# Patient Record
Sex: MALE | Race: BLACK OR AFRICAN AMERICAN | Employment: FULL TIME | URBAN - METROPOLITAN AREA
[De-identification: names, ages, dates, MRNs, and addresses within clinical notes are randomized per-mention and may not be internally consistent; named-entity substitution may affect disease eponyms.]

---

## 2021-01-28 ENCOUNTER — OFFICE VISIT (OUTPATIENT)
Dept: FAMILY MEDICINE CLINIC | Age: 41
End: 2021-01-28
Payer: MEDICAID

## 2021-01-28 VITALS
HEIGHT: 67 IN | SYSTOLIC BLOOD PRESSURE: 174 MMHG | WEIGHT: 180 LBS | TEMPERATURE: 98.3 F | BODY MASS INDEX: 28.25 KG/M2 | HEART RATE: 79 BPM | RESPIRATION RATE: 17 BRPM | OXYGEN SATURATION: 96 % | DIASTOLIC BLOOD PRESSURE: 108 MMHG

## 2021-01-28 DIAGNOSIS — R35.0 FREQUENT URINATION: Primary | ICD-10-CM

## 2021-01-28 DIAGNOSIS — I10 ESSENTIAL HYPERTENSION: ICD-10-CM

## 2021-01-28 PROCEDURE — 99203 OFFICE O/P NEW LOW 30 MIN: CPT | Performed by: FAMILY MEDICINE

## 2021-01-28 RX ORDER — LISINOPRIL AND HYDROCHLOROTHIAZIDE 20; 25 MG/1; MG/1
1 TABLET ORAL DAILY
Qty: 90 TAB | Refills: 3 | Status: SHIPPED | OUTPATIENT
Start: 2021-01-28 | End: 2021-02-09 | Stop reason: ALTCHOICE

## 2021-01-28 NOTE — PROGRESS NOTES
Chief Complaint   Patient presents with   [facility]     Pt reports that he has been having pain in his feet, some swelling. Pt is concerned that pain is from gout. Pt reports that sometimes it feels like his feet are going to give way. 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 Pt reports that he has been urinating frequently, did have an abnormal blood sugar at his DOT. Pt has noted some decrease in appetite. Subjective: (As above and below)     Chief Complaint   Patient presents with   [email or web address]     he is a 47y.o. year old male who presents for evaluation. Reviewed PmHx, RxHx, FmHx, SocHx, AllgHx and updated in chart. Review of Systems - negative except as listed above    Objective:     Vitals:    01/28/21 1013   BP: (!) 174/108   Pulse: 79   Resp: 17   Temp: 98.3 °F (36.8 °C)   TempSrc: Temporal   SpO2: 96%   Weight: 180 lb (81.6 kg)   Height: 5' 7\" (1.702 m)     Physical Examination: General appearance - alert, well appearing, and in no distress  Mental status - normal mood, behavior, speech, dress, motor activity, and thought processes  Ears - bilateral TM's and external ear canals normal  Chest - clear to auscultation, no wheezes, rales or rhonchi, symmetric air entry  Heart - normal rate, regular rhythm, normal S1, S2, no murmurs, rubs, clicks or gallops  Musculoskeletal - no joint tenderness, deformity or swelling  Extremities - peripheral pulses normal, no pedal edema, no clubbing or cyanosis  Feet cool to touch , even after wearing socks and shoes. Normal pulses  Assessment/ Plan:   1. Frequent urination  Check labs  - HEMOGLOBIN A1C WITH EAG    2. Essential hypertension  -start on medication as written, recheck BP in 2 weeks  - METABOLIC PANEL, COMPREHENSIVE  - CBC W/O DIFF  - LIPID PANEL  - HEMOGLOBIN A1C WITH EAG  - TSH 3RD GENERATION  - lisinopril-hydroCHLOROthiazide (PRINZIDE, ZESTORETIC) 20-25 mg per tablet;  Take 1 Tab by mouth daily.  Dispense: 90 Tab; Refill: 3       I have discussed the diagnosis with the patient and the intended plan as seen in the above orders. The patient has received an after-visit summary and questions were answered concerning future plans.      Medication Side Effects and Warnings were discussed with patient: yes  Patient Labs were reviewed: yes  Patient Past Records were reviewed:  yes    Vita García M.D.

## 2021-01-28 NOTE — PROGRESS NOTES
Chief Complaint   Patient presents with   [facility] Maintenance reviewed     1. Have you been to the ER, urgent care clinic since your last visit? Hospitalized since your last visit? No     2. Have you seen or consulted any other health care providers outside of the [address] [place] [address] since your last visit? Include any pap smears or colon screening.  No

## 2021-01-29 LAB
ALBUMIN SERPL-MCNC: 4.6 G/DL (ref 4–5)
ALBUMIN/GLOB SERPL: 1.9 {RATIO} (ref 1.2–2.2)
ALP SERPL-CCNC: 74 IU/L (ref 39–117)
ALT SERPL-CCNC: 19 IU/L (ref 0–44)
AST SERPL-CCNC: 15 IU/L (ref 0–40)
BILIRUB SERPL-MCNC: 0.4 MG/DL (ref 0–1.2)
BUN SERPL-MCNC: 9 MG/DL (ref 6–20)
BUN/CREAT SERPL: 9 (ref 9–20)
CALCIUM SERPL-MCNC: 10 MG/DL (ref 8.7–10.2)
CHLORIDE SERPL-SCNC: 102 MMOL/L (ref 96–106)
CHOLEST SERPL-MCNC: 142 MG/DL (ref 100–199)
CO2 SERPL-SCNC: 27 MMOL/L (ref 20–29)
CREAT SERPL-MCNC: 0.97 MG/DL (ref 0.76–1.27)
ERYTHROCYTE [DISTWIDTH] IN BLOOD BY AUTOMATED COUNT: 11.9 % (ref 11.6–15.4)
EST. AVERAGE GLUCOSE BLD GHB EST-MCNC: 91 MG/DL
GLOBULIN SER CALC-MCNC: 2.4 G/DL (ref 1.5–4.5)
GLUCOSE SERPL-MCNC: 83 MG/DL (ref 65–99)
HBA1C MFR BLD: 4.8 % (ref 4.8–5.6)
HCT VFR BLD AUTO: 45.1 % (ref 37.5–51)
HDLC SERPL-MCNC: 38 MG/DL
HGB BLD-MCNC: 15 G/DL (ref 13–17.7)
INTERPRETATION, 910389: NORMAL
LDLC SERPL CALC-MCNC: 91 MG/DL (ref 0–99)
MCH RBC QN AUTO: 30.1 PG (ref 26.6–33)
MCHC RBC AUTO-ENTMCNC: 33.3 G/DL (ref 31.5–35.7)
MCV RBC AUTO: 90 FL (ref 79–97)
PLATELET # BLD AUTO: 288 X10E3/UL (ref 150–450)
POTASSIUM SERPL-SCNC: 4.2 MMOL/L (ref 3.5–5.2)
PROT SERPL-MCNC: 7 G/DL (ref 6–8.5)
RBC # BLD AUTO: 4.99 X10E6/UL (ref 4.14–5.8)
SODIUM SERPL-SCNC: 141 MMOL/L (ref 134–144)
TRIGL SERPL-MCNC: 61 MG/DL (ref 0–149)
TSH SERPL DL<=0.005 MIU/L-ACNC: 0.65 UIU/ML (ref 0.45–4.5)
VLDLC SERPL CALC-MCNC: 13 MG/DL (ref 5–40)
WBC # BLD AUTO: 11.9 X10E3/UL (ref 3.4–10.8)

## 2021-02-09 ENCOUNTER — OFFICE VISIT (OUTPATIENT)
Dept: FAMILY MEDICINE CLINIC | Age: 41
End: 2021-02-09
Payer: MEDICAID

## 2021-02-09 VITALS
HEIGHT: 67 IN | OXYGEN SATURATION: 97 % | BODY MASS INDEX: 27.84 KG/M2 | SYSTOLIC BLOOD PRESSURE: 126 MMHG | WEIGHT: 177.4 LBS | DIASTOLIC BLOOD PRESSURE: 79 MMHG | TEMPERATURE: 97.4 F | HEART RATE: 85 BPM | RESPIRATION RATE: 15 BRPM

## 2021-02-09 DIAGNOSIS — I10 ESSENTIAL HYPERTENSION: ICD-10-CM

## 2021-02-09 DIAGNOSIS — R20.0 BILATERAL LEG NUMBNESS: Primary | ICD-10-CM

## 2021-02-09 PROCEDURE — 99214 OFFICE O/P EST MOD 30 MIN: CPT | Performed by: FAMILY MEDICINE

## 2021-02-09 RX ORDER — SPIRONOLACTONE 50 MG/1
50 TABLET, FILM COATED ORAL DAILY
Qty: 90 TAB | Refills: 1 | Status: SHIPPED | OUTPATIENT
Start: 2021-02-09

## 2021-02-09 RX ORDER — LISINOPRIL 20 MG/1
20 TABLET ORAL DAILY
Qty: 90 TAB | Refills: 1 | Status: SHIPPED | OUTPATIENT
Start: 2021-02-09
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
## 2021-02-09 NOTE — PROGRESS NOTES
1. Have you been to the ER, urgent care clinic since your last visit? Hospitalized since your last visit? No    2. Have you seen or consulted any other health care providers outside of the [address] [place] [address] since your last visit? Include any pap smears or colon screening.  No     Health Maintenance Due   Topic Date Due    Pneumococcal 0-64 years (1 of 1 - PPSV23) 02/16/1988    COVID-19 Vaccine (1 of 2) 02/16/1998    DTaP/Tdap/Td series (1 - Tdap) 02/16/2003    Flu Vaccine (1) 09/01/2020

## 2021-02-23 ENCOUNTER — TELEPHONE (OUTPATIENT)
Dept: FAMILY MEDICINE CLINIC | Age: 41
End: 2021-02-23

## 2021-02-23 NOTE — TELEPHONE ENCOUNTER
Pt states phone number was wrong in his demographic info. He has not received a phone call from the care coordinator about the xray we ordered on his spine.  Please call pt to set up xray appt or call care coordinator to have them call the pt. [phone]

## 2021-03-24 ENCOUNTER — TELEPHONE (OUTPATIENT)
Dept: FAMILY MEDICINE CLINIC | Age: 41
End: 2021-03-24

## 2021-03-24 ENCOUNTER — HOSPITAL ENCOUNTER (OUTPATIENT)
Dept: GENERAL RADIOLOGY | Age: 41
Discharge: HOME | End: 2021-03-24
Payer: MEDICAID

## 2021-03-24 DIAGNOSIS — R20.0 BILATERAL LEG NUMBNESS: ICD-10-CM

## 2021-03-24 PROCEDURE — 72100 X-RAY EXAM L-S SPINE 2/3 VWS: CPT

## 2021-03-24 NOTE — TELEPHONE ENCOUNTER
Pt has lost his referral from 2/9/21. I reprinted what was listed under the referrals tab. If something else should be given to pt please print so pt can .

## 2021-03-24 NOTE — PROGRESS NOTES
Mild arthritis noted, recommend follow up with neurology. Referral given during appt.    Please inform

## 2021-03-25 ENCOUNTER — OFFICE VISIT (OUTPATIENT)
Dept: NEUROLOGY | Age: 41
End: 2021-03-25
Payer: MEDICAID

## 2021-03-25 VITALS
OXYGEN SATURATION: 97 % | BODY MASS INDEX: 28.09 KG/M2 | HEIGHT: 67 IN | SYSTOLIC BLOOD PRESSURE: 130 MMHG | TEMPERATURE: 97.7 F | RESPIRATION RATE: 18 BRPM | DIASTOLIC BLOOD PRESSURE: 82 MMHG | WEIGHT: 179 LBS | HEART RATE: 80 BPM

## 2021-03-25 DIAGNOSIS — D47.2 MGUS (MONOCLONAL GAMMOPATHY OF UNKNOWN SIGNIFICANCE): ICD-10-CM

## 2021-03-25 DIAGNOSIS — R93.7 ABNORMAL X-RAY OF LUMBAR SPINE: Primary | ICD-10-CM

## 2021-03-25 DIAGNOSIS — E55.9 VITAMIN D DEFICIENCY: ICD-10-CM

## 2021-03-25 DIAGNOSIS — E53.8 B12 DEFICIENCY: ICD-10-CM

## 2021-03-25 DIAGNOSIS — R20.0 SENSORY LOSS: ICD-10-CM

## 2021-03-25 DIAGNOSIS — Q76.49 LUMBARIZATION, VERTEBRA: ICD-10-CM

## 2021-03-25 PROCEDURE — 99214 OFFICE O/P EST MOD 30 MIN: CPT | Performed by: PSYCHIATRY & NEUROLOGY

## 2021-03-25 RX ORDER — IBUPROFEN 200 MG
800 TABLET ORAL
COMMUNITY
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
## 2021-03-25 NOTE — LETTER
3/25/2021 Patient: Jenny Reynaga YOB: 1980 Date of Visit: 3/25/2021 Maximo Vigil MD 
[address] [place] [address] [address] [employer] Dear Krunal Davis MD, Thank you for referring Mr. Jenny Reynaga to [address] [place] [address] for evaluation. My notes for this consultation are attached. If you have questions, please do not hesitate to call me. I look forward to following your patient along with you. Sincerely, Hilario Apple MD

## 2021-04-06 ENCOUNTER — HOSPITAL ENCOUNTER (OUTPATIENT)
Dept: MRI IMAGING | Age: 41
Discharge: HOME | End: 2021-04-06
Attending: PSYCHIATRY & NEUROLOGY
Payer: MEDICAID

## 2021-04-06 DIAGNOSIS — Q76.49 LUMBARIZATION, VERTEBRA: ICD-10-CM

## 2021-04-06 DIAGNOSIS — R93.7 ABNORMAL X-RAY OF LUMBAR SPINE: ICD-10-CM

## 2021-04-06 PROCEDURE — 72148 MRI LUMBAR SPINE W/O DYE: CPT

## 2021-04-08 DIAGNOSIS — M48.04 THORACIC STENOSIS: Primary | ICD-10-CM

## 2021-04-13 ENCOUNTER — TELEPHONE (OUTPATIENT)
Dept: NEUROLOGY | Age: 41
End: 2021-04-13

## 2021-04-15 ENCOUNTER — HOSPITAL ENCOUNTER (OUTPATIENT)
Dept: MRI IMAGING | Age: 41
Discharge: HOME | End: 2021-04-15
Attending: PSYCHIATRY & NEUROLOGY
Payer: MEDICAID

## 2021-04-15 DIAGNOSIS — M48.04 THORACIC STENOSIS: ICD-10-CM

## 2021-04-15 DIAGNOSIS — M47.14 DEGENERATIVE ARTHRITIS OF THORACIC SPINE WITH CORD COMPRESSION: Primary | ICD-10-CM

## 2021-04-15 PROCEDURE — 72146 MRI CHEST SPINE W/O DYE: CPT

## 2021-04-15 RX ORDER — DEXAMETHASONE 4 MG/1
4 TABLET ORAL 2 TIMES DAILY WITH MEALS
Qty: 20 TAB | Refills: 0 | Status: SHIPPED | OUTPATIENT
Start: 2021-04-15

## 2021-04-16 ENCOUNTER — TELEPHONE (OUTPATIENT)
Dept: NEUROLOGY | Age: 41
End: 2021-04-16

## 2021-04-16 NOTE — TELEPHONE ENCOUNTER
lvm for Dr Christina Huynh office for return call to set up appointment referred for thoracic spine with cord compression.

## 2021-04-19 NOTE — TELEPHONE ENCOUNTER
Spoke with Dr Vladimir Bautista to set up appointment for patient.   Patient has medicaid and they do not accept medicaid

## 2021-04-26 ENCOUNTER — TELEPHONE (OUTPATIENT)
Dept: NEUROLOGY | Age: 41
End: 2021-04-26

## 2022-08-04 ENCOUNTER — DOCUMENTATION ONLY (OUTPATIENT)
Dept: NEUROLOGY | Age: 42
End: 2022-08-04

## 2022-08-04 ENCOUNTER — OFFICE VISIT (OUTPATIENT)
Dept: NEUROLOGY | Age: 42
End: 2022-08-04
Payer: MEDICAID

## 2022-08-04 VITALS
WEIGHT: 183 LBS | HEIGHT: 68 IN | BODY MASS INDEX: 27.74 KG/M2 | SYSTOLIC BLOOD PRESSURE: 150 MMHG | HEART RATE: 85 BPM | DIASTOLIC BLOOD PRESSURE: 90 MMHG | RESPIRATION RATE: 16 BRPM | OXYGEN SATURATION: 98 %

## 2022-08-04 DIAGNOSIS — M47.14 THORACIC SPONDYLOSIS WITH CORD COMPRESSION: ICD-10-CM

## 2022-08-04 DIAGNOSIS — R20.2 PARESTHESIA: Primary | ICD-10-CM

## 2022-08-04 PROCEDURE — 99213 OFFICE O/P EST LOW 20 MIN: CPT | Performed by: PSYCHIATRY & NEUROLOGY

## 2022-08-04 RX ORDER — HYDROCHLOROTHIAZIDE 25 MG/1
25 TABLET ORAL DAILY
COMMUNITY

## 2022-08-04 RX ORDER — GABAPENTIN 300 MG/1
300 CAPSULE ORAL 3 TIMES DAILY
Qty: 90 CAPSULE | Refills: 0 | Status: SHIPPED | OUTPATIENT
Start: 2022-08-04 | End: 2022-09-03

## 2022-08-04 NOTE — PROGRESS NOTES
Chief Complaint   Patient presents with    New Patient     Numbness: Patient reports numbness on both legs (A few years ago). Reports it gets cold.       Visit Vitals  BP (!) 150/90   Pulse 85   Resp 16   Ht 5' 8\" (1.727 m)   Wt 183 lb (83 kg)   SpO2 98%   BMI 27.83 kg/m²

## 2022-08-04 NOTE — PROGRESS NOTES
Referrals faxed to [facility]. Galina Isabel MD for neurosurgery. Received a successful confirmation.

## 2022-08-04 NOTE — PROGRESS NOTES
Neurology Clinic Follow up Note    Patient ID:  Taylor Hayes  [id number]  38 y.o.  1980      Mr. Harris is here for follow up today of  Chief Complaint   Patient presents with    New Patient     Numbness: Patient reports numbness on both legs (A few years ago). Reports it gets cold. Last Appointment With Me:  Visit date not found       Interval History:     Interval since last being seen by Dr. Miki Acosta does not 21 patient underwent imaging which appeared to demonstrate compression of the lower thoracic cord. Patient states that he was told by Dr. Ludin Cahn to go to various ERs to build a paper trail to facilitate surgery (?). Since that time symptoms have remained largely unchanged. PMHx/ PSHx/ FHx/ SHx:  Reviewed and unchanged previous visit. ROS:  Comprehensive review of systems negative except for as noted above. Objective:       Meds:  Current Outpatient Medications   Medication Sig Dispense Refill    hydroCHLOROthiazide (HYDRODIURIL) 25 mg tablet Take 25 mg by mouth in the morning.      gabapentin (NEURONTIN) 300 mg capsule Take 1 Capsule by mouth three (3) times daily for 30 days. Max Daily Amount: 900 mg. 90 Capsule 0    spironolactone (ALDACTONE) 50 mg tablet Take 1 Tab by mouth daily. 90 Tab 1    dexAMETHasone (Decadron) 4 mg tablet Take 4 mg by mouth two (2) times daily (with meals). (Patient not taking: Reported on 8/4/2022) 20 Tab 0    ibuprofen (MOTRIN) 200 mg tablet Take 800 mg by mouth. (Patient not taking: Reported on 8/4/2022)      lisinopriL (PRINIVIL, ZESTRIL) 20 mg tablet Take 1 Tab by mouth daily. (Patient not taking: Reported on 8/4/2022) 90 Tab 1       Exam:  Visit Vitals  BP (!) 150/90   Pulse 85   Resp 16   Ht 5' 8\" (1.727 m)   Wt 183 lb (83 kg)   SpO2 98%   BMI 27.83 kg/m²     Pleasant member scannable in exam room in no distress. HEENT appears grossly unremarkable neck appears supple. Cardiopulmonary exams appear unremarkable.   Extremities are warm/dry. Neurologically patient appears alert and oriented attention appears intact. Speech is clear, language fluent. Cranial nerves II through XII appear grossly unrevealing. Motorically, patient demonstrates full strength in upper extremities. Trace weakness is noted in right iliopsoas, knee flexion and dorsiflexion. Strength is out of 5 in left leg. Primary gait and station appears reasonably intact. Coordination is intact in upper extremities, no tremor at rest with posture intention. Remainder examination is deferred    LABS  Results for orders placed or performed in visit on 03/93/45   METABOLIC PANEL, COMPREHENSIVE   Result Value Ref Range    Glucose 83 65 - 99 mg/dL    BUN 9 6 - 20 mg/dL    Creatinine 0.97 0.76 - 1.27 mg/dL    GFR est non-AA 99 >59 mL/min/1.73    GFR est  >59 mL/min/1.73    BUN/Creatinine ratio 9 9 - 20    Sodium 141 134 - 144 mmol/L    Potassium 4.2 3.5 - 5.2 mmol/L    Chloride 102 96 - 106 mmol/L    CO2 27 20 - 29 mmol/L    Calcium 10.0 8.7 - 10.2 mg/dL    Protein, total 7.0 6.0 - 8.5 g/dL    Albumin 4.6 4.0 - 5.0 g/dL    GLOBULIN, TOTAL 2.4 1.5 - 4.5 g/dL    A-G Ratio 1.9 1.2 - 2.2    Bilirubin, total 0.4 0.0 - 1.2 mg/dL    Alk.  phosphatase 74 39 - 117 IU/L    AST (SGOT) 15 0 - 40 IU/L    ALT (SGPT) 19 0 - 44 IU/L   CBC W/O DIFF   Result Value Ref Range    WBC 11.9 (H) 3.4 - 10.8 x10E3/uL    RBC 4.99 4.14 - 5.80 x10E6/uL    HGB 15.0 13.0 - 17.7 g/dL    HCT 45.1 37.5 - 51.0 %    MCV 90 79 - 97 fL    MCH 30.1 26.6 - 33.0 pg    MCHC 33.3 31.5 - 35.7 g/dL    RDW 11.9 11.6 - 15.4 %    PLATELET 099 555 - 744 x10E3/uL   LIPID PANEL   Result Value Ref Range    Cholesterol, total 142 100 - 199 mg/dL    Triglyceride 61 0 - 149 mg/dL    HDL Cholesterol 38 (L) >39 mg/dL    VLDL, calculated 13 5 - 40 mg/dL    LDL, calculated 91 0 - 99 mg/dL   HEMOGLOBIN A1C WITH EAG   Result Value Ref Range    Hemoglobin A1c 4.8 4.8 - 5.6 %    Estimated average glucose 91 mg/dL   TSH 3RD GENERATION   Result Value Ref Range    TSH 0.653 0.450 - 4.500 uIU/mL   CVD REPORT   Result Value Ref Range    INTERPRETATION Note        IMAGING:  MRI Results (most recent):  Results from Hospital Encounter encounter on 04/15/21    MRI [facility] SPINE WO CONT    Narrative  EXAM: MRI [facility] SPINE WO CONT  Clinical history: Spondylolisthesis  INDICATION: . Spinal stenosis, thoracic region / Special focus T11-12. See  lumbar MRI    COMPARISON: Correlation with MR lumbar spine for 4/6/2021    TECHNIQUE: MR imaging of the thoracic spine was performed using the following  sequences: sagittal T1, T2, stir; axial T1, T2.    CONTRAST: None. FINDINGS:  Please note that numbering convention from lumbar spine MRI was utilized for  this examination. T11-T12: Posterior element hypertrophy is present on the left. Facet arthropathy  is greater on the left. Moderate canal stenosis. Severe left foraminal stenosis. Mild cord edema. Vertebral body heights are maintained. Marrow signal is normal.    The paraspinal soft tissues are within normal limits. There is no herniation or stenosis. Impression  Disc and posterior element hypertrophic changes greater on the left at T11-T12. Moderate canal stenosis with severe left lateral recess stenosis and severe left  foraminal stenosis at T11-T12. Mild cord compression. Cord edema at T11-T12. No  definite cord myelomalacia. Numbering convention utilized on lumbar spine MRI of 4/6/2021 was utilized. 6 lumbar type vertebral bodies.     23X      Assessment:     Valentino Arbour is a 38year old RH dominant male aware history of slowly progressive lower extremity sensory deficits with documented lower thoracic cord posterior compression on prior MRI who presents for repeat evaluation    Plan:   Bilateral lower extremity sensory loss:  Given history, imaging findings suspect that symptoms are related to prior imaging findings  Not clear why patient was not previously referred for surgical evaluation by previous neurologist  Will repeat thoracic MRI, attempt gabapentin for paresthesias and refer to neurosurgery for evaluation    Follow-up as needed      Signed:  Starla Verdugo MD  8/4/2022  10:25 AM

## 2022-08-08 NOTE — PROGRESS NOTES
Anahi Julian is non-par with patient's insurance. Neurosurgery referral faxed to [facility].

## 2022-09-04 ENCOUNTER — HOSPITAL ENCOUNTER (OUTPATIENT)
Dept: MRI IMAGING | Age: 42
Discharge: HOME | End: 2022-09-04
Attending: PSYCHIATRY & NEUROLOGY
Payer: MEDICAID

## 2022-09-04 DIAGNOSIS — M47.14 THORACIC SPONDYLOSIS WITH CORD COMPRESSION: ICD-10-CM

## 2022-09-04 PROCEDURE — 72157 MRI CHEST SPINE W/O & W/DYE: CPT

## 2022-09-04 PROCEDURE — 74011250636 HC RX REV CODE- 250/636: Performed by: PSYCHIATRY & NEUROLOGY

## 2022-09-04 PROCEDURE — A9576 INJ PROHANCE MULTIPACK: HCPCS | Performed by: PSYCHIATRY & NEUROLOGY

## 2022-09-04 RX ADMIN — GADOTERIDOL 15 ML: 279.3 INJECTION, SOLUTION INTRAVENOUS at 09:30

## 2022-09-07 ENCOUNTER — TELEPHONE (OUTPATIENT)
Dept: NEUROLOGY | Age: 42
End: 2022-09-07

## 2022-09-07 NOTE — TELEPHONE ENCOUNTER
Karla with [facility] wants to verify that mri results are accessible. Please confirm and call to verify.

## 2022-09-13 ENCOUNTER — TELEPHONE (OUTPATIENT)
Dept: NEUROLOGY | Age: 42
End: 2022-09-13

## 2023-02-13 ENCOUNTER — OFFICE VISIT (OUTPATIENT)
Dept: FAMILY MEDICINE CLINIC | Age: 43
End: 2023-02-13
Payer: MEDICAID

## 2023-02-13 VITALS
RESPIRATION RATE: 16 BRPM | SYSTOLIC BLOOD PRESSURE: 156 MMHG | DIASTOLIC BLOOD PRESSURE: 94 MMHG | WEIGHT: 193.2 LBS | BODY MASS INDEX: 29.28 KG/M2 | OXYGEN SATURATION: 96 % | HEART RATE: 91 BPM | HEIGHT: 68 IN

## 2023-02-13 DIAGNOSIS — M54.9 BACK PAIN WITH HISTORY OF SPINAL SURGERY: Primary | ICD-10-CM

## 2023-02-13 DIAGNOSIS — Z98.890 BACK PAIN WITH HISTORY OF SPINAL SURGERY: Primary | ICD-10-CM

## 2023-02-13 DIAGNOSIS — I10 ESSENTIAL HYPERTENSION: ICD-10-CM

## 2023-02-13 PROCEDURE — 99213 OFFICE O/P EST LOW 20 MIN: CPT | Performed by: FAMILY MEDICINE

## 2023-02-13 PROCEDURE — 3077F SYST BP >= 140 MM HG: CPT | Performed by: FAMILY MEDICINE

## 2023-02-13 PROCEDURE — 3080F DIAST BP >= 90 MM HG: CPT | Performed by: FAMILY MEDICINE

## 2023-02-13 RX ORDER — AMLODIPINE BESYLATE 5 MG/1
5 TABLET ORAL DAILY
Qty: 90 TABLET | Refills: 3 | Status: SHIPPED | OUTPATIENT
Start: 2023-02-13

## 2023-02-13 RX ORDER — LISINOPRIL AND HYDROCHLOROTHIAZIDE 20; 25 MG/1; MG/1
1 TABLET ORAL DAILY
Qty: 90 TABLET | Refills: 3 | Status: SHIPPED | OUTPATIENT
Start: 2023-02-13

## 2023-02-13 NOTE — PROGRESS NOTES
Chief Complaint   Patient presents with    Medication Evaluation     Pt has been off of all BP medications. 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 Pt had back surgery recently, overall better, but still some good days and some bad days. Subjective: (As above and below)     Chief Complaint   Patient presents with    Medication Evaluation     he is a 38y.o. year old male who presents for evaluation. Reviewed PmHx, RxHx, FmHx, SocHx, AllgHx and updated in chart. Review of Systems - negative except as listed above    Objective:     Vitals:    02/13/23 1633   BP: (!) 156/94   Pulse: 91   Resp: 16   SpO2: 96%   Weight: 193 lb 3.2 oz (87.6 kg)   Height: 5' 8\" (1.727 m)     Physical Examination: General appearance - alert, well appearing, and in no distress  Mental status - normal mood, behavior, speech, dress, motor activity, and thought processes  Mouth - mucous membranes moist, pharynx normal without lesions  Chest - clear to auscultation, no wheezes, rales or rhonchi, symmetric air entry  Heart - normal rate, regular rhythm, normal S1, S2, no murmurs, rubs, clicks or gallops  Musculoskeletal - no joint tenderness, deformity or swelling  Extremities - peripheral pulses normal, no pedal edema, no clubbing or cyanosis    Assessment/ Plan:   1. Essential hypertension  -resume medications, add amlodipine  - lisinopril-hydroCHLOROthiazide (PRINZIDE, ZESTORETIC) 20-25 mg per tablet; Take 1 Tablet by mouth daily. Dispense: 90 Tablet; Refill: 3  - amLODIPine (NORVASC) 5 mg tablet; Take 1 Tablet by mouth daily. Dispense: 90 Tablet; Refill: 3    2. Back pain with history of spinal surgery  Follow up with surgeon      I have discussed the diagnosis with the patient and the intended plan as seen in the above orders. The patient has received an after-visit summary and questions were answered concerning future plans.      Medication Side Effects and Warnings were discussed with patient: yes  Patient Labs were reviewed: yes  Patient Past Records were reviewed:  yes    Patricia Dave M.D.

## 2023-02-13 NOTE — PROGRESS NOTES
Chief Complaint   Patient presents with    Medication Evaluation         Health Maintenance Due   Topic Date Due    Hepatitis C Screening  Never done    COVID-19 Vaccine (1) Never done    Pneumococcal 0-64 years (1 - PCV) Never done    DTaP/Tdap/Td series (1 - Tdap) Never done    Flu Vaccine (1) Never done     1. \"Have you been to the ER, urgent care clinic since your last visit? Hospitalized since your last visit? \"  No    2. \"Have you seen or consulted any other health care providers outside of the [address] [place] [address] since your last visit? \"  Yes, back surgery on Jan 19th Dr. Denise Hernandez       3. For patients aged 39-70: Has the patient had a colonoscopy / FIT/ Cologuard? NA - based on age      If the patient is female:    4. For patients aged 41-77: Has the patient had a mammogram within the past 2 years? NA - based on age or sex      11. For patients aged 21-65: Has the patient had a pap smear?  NA - based on age or sex

## 2023-11-22 ENCOUNTER — OFFICE VISIT (OUTPATIENT)
Age: 43
End: 2023-11-22
Payer: MEDICAID

## 2023-11-22 VITALS
WEIGHT: 185 LBS | OXYGEN SATURATION: 96 % | DIASTOLIC BLOOD PRESSURE: 84 MMHG | HEIGHT: 67 IN | SYSTOLIC BLOOD PRESSURE: 127 MMHG | HEART RATE: 74 BPM | RESPIRATION RATE: 16 BRPM | BODY MASS INDEX: 29.03 KG/M2

## 2023-11-22 DIAGNOSIS — Z72.0 TOBACCO USE: ICD-10-CM

## 2023-11-22 DIAGNOSIS — F12.90 MARIJUANA USE: ICD-10-CM

## 2023-11-22 DIAGNOSIS — M54.9 BACK PAIN WITH HISTORY OF SPINAL SURGERY: ICD-10-CM

## 2023-11-22 DIAGNOSIS — I10 ESSENTIAL HYPERTENSION: Primary | ICD-10-CM

## 2023-11-22 DIAGNOSIS — Z98.890 BACK PAIN WITH HISTORY OF SPINAL SURGERY: ICD-10-CM

## 2023-11-22 PROBLEM — F17.210: Status: ACTIVE | Noted: 2022-12-02

## 2023-11-22 PROCEDURE — 99214 OFFICE O/P EST MOD 30 MIN: CPT | Performed by: FAMILY MEDICINE

## 2023-11-22 PROCEDURE — 3074F SYST BP LT 130 MM HG: CPT | Performed by: FAMILY MEDICINE

## 2023-11-22 PROCEDURE — 3079F DIAST BP 80-89 MM HG: CPT | Performed by: FAMILY MEDICINE

## 2023-11-22 RX ORDER — LISINOPRIL AND HYDROCHLOROTHIAZIDE 25; 20 MG/1; MG/1
1 TABLET ORAL DAILY
Qty: 90 TABLET | Refills: 3 | Status: SHIPPED | OUTPATIENT
Start: 2023-11-22 | End: 2023-11-22

## 2023-11-22 RX ORDER — AMLODIPINE BESYLATE 5 MG/1
5 TABLET ORAL DAILY
Qty: 30 TABLET | Refills: 0 | Status: SHIPPED | OUTPATIENT
Start: 2023-11-22

## 2023-11-22 RX ORDER — AMLODIPINE BESYLATE 5 MG/1
5 TABLET ORAL DAILY
Qty: 90 TABLET | Refills: 3 | Status: SHIPPED | OUTPATIENT
Start: 2023-11-22 | End: 2023-11-22

## 2023-11-22 RX ORDER — LISINOPRIL AND HYDROCHLOROTHIAZIDE 25; 20 MG/1; MG/1
1 TABLET ORAL DAILY
Qty: 30 TABLET | Refills: 0 | Status: SHIPPED | OUTPATIENT
Start: 2023-11-22

## 2023-11-22 RX ORDER — GABAPENTIN 300 MG/1
300 CAPSULE ORAL 3 TIMES DAILY
Qty: 90 CAPSULE | Refills: 5 | COMMUNITY
Start: 2023-08-23 | End: 2023-11-22 | Stop reason: SDUPTHER

## 2023-11-22 RX ORDER — IBUPROFEN 200 MG
800 TABLET ORAL EVERY 8 HOURS PRN
Qty: 120 TABLET | Refills: 0 | Status: SHIPPED | OUTPATIENT
Start: 2023-11-22 | End: 2023-11-22

## 2023-11-22 RX ORDER — SPIRONOLACTONE 50 MG/1
50 TABLET, FILM COATED ORAL DAILY
Qty: 90 TABLET | Refills: 3 | Status: SHIPPED | OUTPATIENT
Start: 2023-11-22 | End: 2023-11-22

## 2023-11-22 RX ORDER — GABAPENTIN 300 MG/1
300 CAPSULE ORAL 3 TIMES DAILY
Qty: 90 CAPSULE | Refills: 2 | Status: SHIPPED | OUTPATIENT
Start: 2023-11-22 | End: 2023-11-22

## 2023-11-22 SDOH — ECONOMIC STABILITY: FOOD INSECURITY: WITHIN THE PAST 12 MONTHS, THE FOOD YOU BOUGHT JUST DIDN'T LAST AND YOU DIDN'T HAVE MONEY TO GET MORE.: NEVER TRUE

## 2023-11-22 SDOH — ECONOMIC STABILITY: INCOME INSECURITY: HOW HARD IS IT FOR YOU TO PAY FOR THE VERY BASICS LIKE FOOD, HOUSING, MEDICAL CARE, AND HEATING?: NOT HARD AT ALL

## 2023-11-22 SDOH — ECONOMIC STABILITY: HOUSING INSECURITY
IN THE LAST 12 MONTHS, WAS THERE A TIME WHEN YOU DID NOT HAVE A STEADY PLACE TO SLEEP OR SLEPT IN A SHELTER (INCLUDING NOW)?: NO

## 2023-11-22 SDOH — ECONOMIC STABILITY: FOOD INSECURITY: WITHIN THE PAST 12 MONTHS, YOU WORRIED THAT YOUR FOOD WOULD RUN OUT BEFORE YOU GOT MONEY TO BUY MORE.: NEVER TRUE

## 2023-11-22 ASSESSMENT — PATIENT HEALTH QUESTIONNAIRE - PHQ9
SUM OF ALL RESPONSES TO PHQ9 QUESTIONS 1 & 2: 0
SUM OF ALL RESPONSES TO PHQ QUESTIONS 1-9: 0
1. LITTLE INTEREST OR PLEASURE IN DOING THINGS: 0
SUM OF ALL RESPONSES TO PHQ QUESTIONS 1-9: 0
SUM OF ALL RESPONSES TO PHQ QUESTIONS 1-9: 0
2. FEELING DOWN, DEPRESSED OR HOPELESS: 0
SUM OF ALL RESPONSES TO PHQ QUESTIONS 1-9: 0

## 2023-11-22 NOTE — PROGRESS NOTES
Pt refused labs after visit, refills canceled and resent for 30 days with no refills, no further refills will be provided until labs are complete. Pt informed.

## 2023-11-22 NOTE — PROGRESS NOTES
Chief Complaint   Patient presents with    Extremity Weakness    Back Pain     Pain started after having back surgery in January. Pt reports that he has not been back to work since January, pt has seen surgeon several times. 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 Pt was given gabapentin, referred to therapy. Pt reports that he is eating differently due to pain. Subjective: (As above and below)     Chief Complaint   Patient presents with    Extremity Weakness    Back Pain     he is a 41y.o. year old male who presents for evaluation. Reviewed PmHx, RxHx, FmHx, SocHx, AllgHx and updated in chart. Review of Systems - negative except as listed above    Objective:     Vitals:    11/22/23 0912 11/22/23 0925   BP: (!) 148/96 127/84   Site: Left Upper Arm Left Upper Arm   Position: Sitting Sitting   Cuff Size: Large Adult Large Adult   Pulse: 74    Resp: 16    SpO2: 96%    Weight: 83.9 kg (185 lb)    Height: 1.702 m (5' 7\")      Physical Examination: General appearance - alert, well appearing, and in no distress  Mental status - normal mood, behavior, speech, dress, motor activity, and thought processes  Mouth - mucous membranes moist, pharynx normal without lesions  Chest - clear to auscultation, no wheezes, rales or rhonchi, symmetric air entry  Heart - normal rate, regular rhythm, normal S1, S2, no murmurs, rubs, clicks or gallops  Musculoskeletal - sitting comfortably, altered gait  Extremities - peripheral pulses normal, no pedal edema, no clubbing or cyanosis    Assessment/ Plan:   1. Essential hypertension  -well controlled  - amLODIPine (NORVASC) 5 MG tablet; Take 1 tablet by mouth daily  Dispense: 90 tablet; Refill: 3  - lisinopril-hydroCHLOROthiazide (PRINZIDE;ZESTORETIC) 20-25 MG per tablet; Take 1 tablet by mouth daily  Dispense: 90 tablet; Refill: 3  - spironolactone (ALDACTONE) 50 MG tablet;  Take 1 tablet by mouth daily